# Patient Record
Sex: FEMALE | Race: WHITE | NOT HISPANIC OR LATINO | Employment: PART TIME | URBAN - NONMETROPOLITAN AREA
[De-identification: names, ages, dates, MRNs, and addresses within clinical notes are randomized per-mention and may not be internally consistent; named-entity substitution may affect disease eponyms.]

---

## 2023-06-19 ENCOUNTER — APPOINTMENT (OUTPATIENT)
Dept: PRIMARY CARE | Facility: CLINIC | Age: 53
End: 2023-06-19
Payer: COMMERCIAL

## 2023-06-20 ASSESSMENT — PROMIS GLOBAL HEALTH SCALE
RATE_GENERAL_HEALTH: GOOD
RATE_PHYSICAL_HEALTH: GOOD
EMOTIONAL_PROBLEMS: NEVER
RATE_AVERAGE_PAIN: 0
CARRYOUT_PHYSICAL_ACTIVITIES: COMPLETELY
RATE_QUALITY_OF_LIFE: EXCELLENT
CARRYOUT_SOCIAL_ACTIVITIES: EXCELLENT
RATE_SOCIAL_SATISFACTION: EXCELLENT
RATE_MENTAL_HEALTH: EXCELLENT

## 2023-06-21 PROBLEM — E55.9 VITAMIN D DEFICIENCY: Status: ACTIVE | Noted: 2023-06-21

## 2023-06-21 PROBLEM — R05.9 COUGH: Status: ACTIVE | Noted: 2023-06-21

## 2023-06-21 PROBLEM — G47.00 INSOMNIA: Status: ACTIVE | Noted: 2023-06-21

## 2023-06-21 PROBLEM — F33.2 MODERATELY SEVERE RECURRENT MAJOR DEPRESSION (MULTI): Status: ACTIVE | Noted: 2023-06-21

## 2023-06-21 PROBLEM — F51.04 PSYCHOPHYSIOLOGICAL INSOMNIA: Status: ACTIVE | Noted: 2023-06-21

## 2023-06-21 PROBLEM — F41.9 MODERATE ANXIETY: Status: ACTIVE | Noted: 2023-06-21

## 2023-06-21 PROBLEM — R63.4 WEIGHT LOSS: Status: ACTIVE | Noted: 2023-06-21

## 2023-06-21 PROBLEM — F51.09 SITUATIONAL INSOMNIA: Status: ACTIVE | Noted: 2023-06-21

## 2023-06-21 PROBLEM — C44.91 BCC (BASAL CELL CARCINOMA): Status: ACTIVE | Noted: 2023-06-21

## 2023-06-21 PROBLEM — J32.9 SINUSITIS: Status: ACTIVE | Noted: 2023-06-21

## 2023-06-21 RX ORDER — SERTRALINE HYDROCHLORIDE 50 MG/1
3 TABLET, FILM COATED ORAL DAILY
COMMUNITY
Start: 2021-02-03 | End: 2023-06-22 | Stop reason: SDUPTHER

## 2023-06-21 RX ORDER — ERGOCALCIFEROL 1.25 MG/1
1 CAPSULE ORAL
COMMUNITY
Start: 2021-05-25 | End: 2023-06-22 | Stop reason: ALTCHOICE

## 2023-06-21 RX ORDER — TRAZODONE HYDROCHLORIDE 50 MG/1
TABLET ORAL
COMMUNITY
Start: 2021-02-26 | End: 2023-06-22 | Stop reason: SDUPTHER

## 2023-06-22 ENCOUNTER — OFFICE VISIT (OUTPATIENT)
Dept: PRIMARY CARE | Facility: CLINIC | Age: 53
End: 2023-06-22
Payer: COMMERCIAL

## 2023-06-22 VITALS
HEART RATE: 85 BPM | DIASTOLIC BLOOD PRESSURE: 76 MMHG | HEIGHT: 66 IN | TEMPERATURE: 98.1 F | SYSTOLIC BLOOD PRESSURE: 147 MMHG | RESPIRATION RATE: 16 BRPM | OXYGEN SATURATION: 98 % | WEIGHT: 182.2 LBS | BODY MASS INDEX: 29.28 KG/M2

## 2023-06-22 DIAGNOSIS — E78.5 HYPERLIPIDEMIA, UNSPECIFIED HYPERLIPIDEMIA TYPE: ICD-10-CM

## 2023-06-22 DIAGNOSIS — F41.9 MODERATE ANXIETY: ICD-10-CM

## 2023-06-22 DIAGNOSIS — G47.00 INSOMNIA, UNSPECIFIED TYPE: ICD-10-CM

## 2023-06-22 DIAGNOSIS — E66.3 OVERWEIGHT WITH BODY MASS INDEX (BMI) OF 29 TO 29.9 IN ADULT: ICD-10-CM

## 2023-06-22 DIAGNOSIS — Z00.00 HEALTHCARE MAINTENANCE: Primary | ICD-10-CM

## 2023-06-22 PROBLEM — F51.09 SITUATIONAL INSOMNIA: Status: RESOLVED | Noted: 2023-06-21 | Resolved: 2023-06-22

## 2023-06-22 PROBLEM — F51.04 PSYCHOPHYSIOLOGICAL INSOMNIA: Status: RESOLVED | Noted: 2023-06-21 | Resolved: 2023-06-22

## 2023-06-22 PROCEDURE — 1036F TOBACCO NON-USER: CPT | Performed by: FAMILY MEDICINE

## 2023-06-22 PROCEDURE — 99214 OFFICE O/P EST MOD 30 MIN: CPT | Performed by: FAMILY MEDICINE

## 2023-06-22 PROCEDURE — 99396 PREV VISIT EST AGE 40-64: CPT | Performed by: FAMILY MEDICINE

## 2023-06-22 PROCEDURE — 3008F BODY MASS INDEX DOCD: CPT | Performed by: FAMILY MEDICINE

## 2023-06-22 RX ORDER — ROSUVASTATIN CALCIUM 20 MG/1
20 TABLET, COATED ORAL DAILY
Qty: 30 TABLET | Refills: 5 | Status: SHIPPED | OUTPATIENT
Start: 2023-06-22 | End: 2023-06-22 | Stop reason: SDUPTHER

## 2023-06-22 RX ORDER — ROSUVASTATIN CALCIUM 20 MG/1
20 TABLET, COATED ORAL DAILY
Qty: 30 TABLET | Refills: 5 | Status: SHIPPED | OUTPATIENT
Start: 2023-06-22 | End: 2023-12-19

## 2023-06-22 RX ORDER — SERTRALINE HYDROCHLORIDE 50 MG/1
150 TABLET, FILM COATED ORAL DAILY
Qty: 90 TABLET | Refills: 3 | Status: SHIPPED | OUTPATIENT
Start: 2023-06-22 | End: 2024-01-02

## 2023-06-22 RX ORDER — TRAZODONE HYDROCHLORIDE 50 MG/1
TABLET ORAL
Qty: 180 TABLET | Refills: 3 | Status: SHIPPED | OUTPATIENT
Start: 2023-06-22

## 2023-06-22 ASSESSMENT — ANXIETY QUESTIONNAIRES
1. FEELING NERVOUS, ANXIOUS, OR ON EDGE: NOT AT ALL
5. BEING SO RESTLESS THAT IT IS HARD TO SIT STILL: NOT AT ALL
2. NOT BEING ABLE TO STOP OR CONTROL WORRYING: NOT AT ALL
6. BECOMING EASILY ANNOYED OR IRRITABLE: NOT AT ALL
7. FEELING AFRAID AS IF SOMETHING AWFUL MIGHT HAPPEN: NOT AT ALL
IF YOU CHECKED OFF ANY PROBLEMS ON THIS QUESTIONNAIRE, HOW DIFFICULT HAVE THESE PROBLEMS MADE IT FOR YOU TO DO YOUR WORK, TAKE CARE OF THINGS AT HOME, OR GET ALONG WITH OTHER PEOPLE: NOT DIFFICULT AT ALL
4. TROUBLE RELAXING: NOT AT ALL
3. WORRYING TOO MUCH ABOUT DIFFERENT THINGS: NOT AT ALL
GAD7 TOTAL SCORE: 0

## 2023-06-22 ASSESSMENT — PATIENT HEALTH QUESTIONNAIRE - PHQ9
2. FEELING DOWN, DEPRESSED OR HOPELESS: NOT AT ALL
SUM OF ALL RESPONSES TO PHQ9 QUESTIONS 1 AND 2: 0
1. LITTLE INTEREST OR PLEASURE IN DOING THINGS: NOT AT ALL

## 2023-06-22 ASSESSMENT — PAIN SCALES - GENERAL: PAINLEVEL: 0-NO PAIN

## 2023-06-22 NOTE — PROGRESS NOTES
"Subjective   Patient ID: Izzy Benitez is a 52 y.o. female who presents for Annual Exam and Results (Wants to discuss blood work results).    Well Adult Physical   Patient here for a comprehensive physical exam.      TDAP: 5/2016  SHINGRIX: completed  PNEUMOVAX: N/A  PAP: none found - managed by GYN  MAMMO: none found - managed by GYN  EGD: 10/2014  CSCOPE: 10/2014  DEXA: N/A  HEP C SCREEN: 8/2021 negative  CACS: none found - deferred 6/2023 due to anxiety  LIPID: 5/2021 TC/HDL ratio 41, [id number]    Diet/Exercise: Feels she has not been eating as well regularly.  Eating horribly, lots of sugar/chocolate, fast food.  Not eating enough veggies.  Stopped drinking after dry January   Stopped having palpitations with no alcohol intake    Father with hx of a-fib s/p ablation.   Mom and Father with hx of of HLD  She reports hx of white coat hypertension  BP normal at GYN recently    Breast cancer screening: Managed by GYN  Denies lumps/bumps, skin changes, nipple retraction, or nipple drainage.    Cervical cancer screening: Managed by GYN  Denies pelvic pain, vaginal discharge, or vaginal bleeding.    Colon cancer screening: Denies family history.   Denies melena, hematochezia, constipation, diarrhea, bloating, change in bowel habits.    MOOD  In 2021 she had period of not sleeping for 4 days  Had bad anxiety after this  Lost 30+ pounds, hair falling out   Started Zoloft and then started feeling like herself again.    She notes she has been deferring treatment for other issues because of denial from her anxiety.    Patient denies chest pain, shortness of breath with exertion, palpitations, lower extremity edema, headache, or dizziness.     Review of Systems   All other systems reviewed and are negative.      Objective   /76 (BP Location: Left arm, Patient Position: Sitting, BP Cuff Size: Adult)   Pulse 85   Temp 36.7 °C (98.1 °F) (Temporal)   Resp 16   Ht 1.664 m (5' 5.5\")   Wt 82.6 kg (182 lb 3.2 oz)   LMP " 04/17/2023 (Approximate)   SpO2 98%   BMI 29.86 kg/m²     Physical Exam  Vitals and nursing note reviewed.   Constitutional:       General: She is not in acute distress.     Appearance: Normal appearance. She is not toxic-appearing.   HENT:      Head: Normocephalic and atraumatic.   Eyes:      Extraocular Movements: Extraocular movements intact.      Pupils: Pupils are equal, round, and reactive to light.   Neck:      Thyroid: No thyromegaly.      Vascular: No hepatojugular reflux or JVD.   Cardiovascular:      Rate and Rhythm: Normal rate and regular rhythm.      Heart sounds: Murmur heard.      Systolic murmur is present with a grade of 1/6.      No friction rub. No gallop.   Pulmonary:      Effort: Pulmonary effort is normal.      Breath sounds: Normal breath sounds. No wheezing, rhonchi or rales.   Abdominal:      General: Bowel sounds are normal. There is no distension.      Palpations: Abdomen is soft. There is no mass.      Tenderness: There is no abdominal tenderness. There is no guarding.   Musculoskeletal:         General: Normal range of motion.      Right lower leg: No edema.      Left lower leg: No edema.   Lymphadenopathy:      Cervical: No cervical adenopathy.   Skin:     General: Skin is warm and dry.   Neurological:      General: No focal deficit present.      Mental Status: She is alert and oriented to person, place, and time.      Gait: Gait normal.   Psychiatric:         Mood and Affect: Mood normal.         Behavior: Behavior normal.         Assessment/Plan   Problem List Items Addressed This Visit       Insomnia     Stable, continue with Trazodone at bedtime as needed.         Moderate anxiety     Scales and scores have been reviewed and discussed, doing well.  Has been stable on the Zoloft for years now, she will continue with this.  Other non-prescription measures revisited with patient.         Healthcare maintenance - Primary     Vaccines and screenings reviewed.  Questionnaires  completed.  Health and wellness topics reviewed.  Diet and exercise recommendations revisited.  Routine blood work ordered today.  ROR for most recent pap, mammo, and colonoscopy.         Relevant Orders    Comprehensive Metabolic Panel    Lipid Panel    Overweight with body mass index (BMI) of 29 to 29.9 in adult    Hyperlipidemia     Outside labs reviewed with patient today,   Guidelines recommend statin therapy in those with LDL greater than 190  Patient agreeable to trial with statin therapy, Atorvastatin 20 mg sent to pharm.  CACS discussed with patient today, this was deferred due to patient's history of anxiety, would like to try statin/lifestyle measures and recheck blood work first.    Statin therapy is used to stabilize and regress plaque build up. Patients typically tolerate statin therapy well. The most common side effect of statin therapy is muscle aches and cramping. To protect against muscle cramping Recommend supplementation with Vitamin D3 2942-9795 IU daily, which can be purchased over the counter.     Diet and exercise recommendations revisited.  To lower this with lifestyle measures:  -make sure you are avoiding refined carbs such as breads, pasta, cereal, candy, soda,  nutrition bars, granola, chips, and sugar sweetened beverages.      -eat 5- 7 servings daily of veggies,  healthy protein such as chicken, fish,  beans, and eggs, and include healthy fats in your diet such as seeds, nuts, olive oil, avocados, and salmon.   -exercise 4 - 6 days per week as you are able, 150 minutes total weekly divided up is recommended.  -consider taking the fiber product psyllium capsules or powder 2 times daily (generic brand is fine) , or a brand name psyllium such as [facility] [facility] or Metamucil.  -consider also adding plant stanols and sterols such as Nature Made CholestOff, available over the counter.          Relevant Medications    rosuvastatin (Crestor) 20 mg tablet     Follow-up in 4 months for  recheck above + lab review.  Labs to be done prior.   Call for sooner follow-up if needed.          Scribe Attestation  By signing my name below, I, Anand Vargas   attest that this documentation has been prepared under the direction and in the presence of Liana Roberts DO.

## 2023-06-22 NOTE — ASSESSMENT & PLAN NOTE
Scales and scores have been reviewed and discussed, doing well.  Has been stable on the Zoloft for years now, she will continue with this.  Other non-prescription measures revisited with patient.

## 2023-06-22 NOTE — ASSESSMENT & PLAN NOTE
Outside labs reviewed with patient today,   Guidelines recommend statin therapy in those with LDL greater than 190  Patient agreeable to trial with statin therapy, Rosuvastatin 20 mg sent to pharm.  CACS discussed with patient today, this was deferred due to patient's history of anxiety, would like to try statin/lifestyle measures and recheck blood work first.    Statin therapy is used to stabilize and regress plaque build up. Patients typically tolerate statin therapy well. The most common side effect of statin therapy is muscle aches and cramping. To protect against muscle cramping Recommend supplementation with Vitamin D3 2192-9141 IU daily, which can be purchased over the counter.     Diet and exercise recommendations revisited.  To lower this with lifestyle measures:  -make sure you are avoiding refined carbs such as breads, pasta, cereal, candy, soda,  nutrition bars, granola, chips, and sugar sweetened beverages.      -eat 5- 7 servings daily of veggies,  healthy protein such as chicken, fish,  beans, and eggs, and include healthy fats in your diet such as seeds, nuts, olive oil, avocados, and salmon.   -exercise 4 - 6 days per week as you are able, 150 minutes total weekly divided up is recommended.  -consider taking the fiber product psyllium capsules or powder 2 times daily (generic brand is fine) , or a brand name psyllium such as [facility] or Metamucil.  -consider also adding plant stanols and sterols such as Nature Made CholestOff, available over the counter.

## 2023-06-22 NOTE — PATIENT INSTRUCTIONS
WELLNESS EXAM COMPLETED TODAY    SCREENINGS:  -For those at average risk, American Cancer Society recommends Screening for Cervical Cancer (pap smears) are recommended starting at age 21 and continuing through age 65.   -For those at average risk, American Cancer Society now recommends breast cancer screening starting at the age of 40-45.  -For those at average risk, American Cancer Society now recommends screening for colon cancer starting at the age of 45.  -Bone density screening recommended starting at 65.    LIFESTYLE MEASURES:  -make sure you are avoiding refined carbs such as breads, pasta, cereal, candy, soda,  nutrition bars, granola, chips, and sugar sweetened beverages.      -eat 5- 7 servings daily of veggies,  healthy protein such as chicken, fish,  beans, and eggs, and include healthy fats in your diet such as seeds, nuts, olive oil, avocados, and salmon.   -exercise 4 - 6 days per week as you are able, 150 minutes total weekly divided up is recommended.  -Vitamin D is recommended at 1000 - 5000 IU international units daily.   -Calcium is recommended at 5584-2959 mg daily.  -64 oz of water is recommended daily.   -Always wear sunscreen when you have sun exposure.  -Dental visits recommended every 6 months.  -Eye exam recommended every 2 years, for those with vision problems every year.

## 2023-06-22 NOTE — ASSESSMENT & PLAN NOTE
Vaccines and screenings reviewed.  Questionnaires completed.  Health and wellness topics reviewed.  Diet and exercise recommendations revisited.  Routine blood work ordered today.  ROR for most recent pap, mammo, and colonoscopy.

## 2023-10-09 ENCOUNTER — APPOINTMENT (OUTPATIENT)
Dept: PRIMARY CARE | Facility: CLINIC | Age: 53
End: 2023-10-09
Payer: COMMERCIAL

## 2023-11-27 ENCOUNTER — APPOINTMENT (OUTPATIENT)
Dept: PRIMARY CARE | Facility: CLINIC | Age: 53
End: 2023-11-27
Payer: COMMERCIAL

## 2023-12-11 ENCOUNTER — APPOINTMENT (OUTPATIENT)
Dept: PRIMARY CARE | Facility: CLINIC | Age: 53
End: 2023-12-11
Payer: COMMERCIAL

## 2023-12-31 DIAGNOSIS — G47.00 INSOMNIA, UNSPECIFIED TYPE: ICD-10-CM

## 2024-01-02 RX ORDER — SERTRALINE HYDROCHLORIDE 50 MG/1
150 TABLET, FILM COATED ORAL DAILY
Qty: 270 TABLET | Refills: 0 | Status: SHIPPED | OUTPATIENT
Start: 2024-01-02

## 2024-03-11 ENCOUNTER — APPOINTMENT (OUTPATIENT)
Dept: PRIMARY CARE | Facility: CLINIC | Age: 54
End: 2024-03-11
Payer: COMMERCIAL

## 2024-04-12 ENCOUNTER — APPOINTMENT (OUTPATIENT)
Dept: PRIMARY CARE | Facility: CLINIC | Age: 54
End: 2024-04-12
Payer: COMMERCIAL

## 2024-05-06 ENCOUNTER — APPOINTMENT (OUTPATIENT)
Dept: PRIMARY CARE | Facility: CLINIC | Age: 54
End: 2024-05-06
Payer: COMMERCIAL

## 2024-06-18 ENCOUNTER — APPOINTMENT (OUTPATIENT)
Dept: PRIMARY CARE | Facility: CLINIC | Age: 54
End: 2024-06-18
Payer: COMMERCIAL

## 2024-07-19 DIAGNOSIS — G47.00 INSOMNIA, UNSPECIFIED TYPE: ICD-10-CM

## 2024-07-19 RX ORDER — SERTRALINE HYDROCHLORIDE 50 MG/1
150 TABLET, FILM COATED ORAL DAILY
Qty: 270 TABLET | Refills: 0 | Status: SHIPPED | OUTPATIENT
Start: 2024-07-19

## 2024-08-19 ENCOUNTER — APPOINTMENT (OUTPATIENT)
Dept: PRIMARY CARE | Facility: CLINIC | Age: 54
End: 2024-08-19
Payer: COMMERCIAL

## 2024-08-21 ENCOUNTER — APPOINTMENT (OUTPATIENT)
Dept: PRIMARY CARE | Facility: CLINIC | Age: 54
End: 2024-08-21
Payer: COMMERCIAL

## 2024-08-21 DIAGNOSIS — F33.2 MODERATELY SEVERE RECURRENT MAJOR DEPRESSION (MULTI): ICD-10-CM

## 2024-08-21 DIAGNOSIS — E78.5 HYPERLIPIDEMIA, UNSPECIFIED HYPERLIPIDEMIA TYPE: ICD-10-CM

## 2024-08-21 DIAGNOSIS — F41.9 MODERATE ANXIETY: Primary | ICD-10-CM

## 2024-08-21 DIAGNOSIS — G47.00 INSOMNIA, UNSPECIFIED TYPE: ICD-10-CM

## 2024-08-21 PROBLEM — R03.0 ELEVATED BLOOD PRESSURE READING WITHOUT DIAGNOSIS OF HYPERTENSION: Status: ACTIVE | Noted: 2024-01-05

## 2024-08-21 PROBLEM — Z85.828 HISTORY OF BASAL CELL CARCINOMA (BCC) OF SKIN: Status: ACTIVE | Noted: 2023-06-21

## 2024-08-21 PROBLEM — E66.3 OVERWEIGHT WITH BODY MASS INDEX (BMI) OF 29 TO 29.9 IN ADULT: Status: RESOLVED | Noted: 2023-06-22 | Resolved: 2024-08-21

## 2024-08-21 PROCEDURE — 1036F TOBACCO NON-USER: CPT | Performed by: FAMILY MEDICINE

## 2024-08-21 PROCEDURE — 99213 OFFICE O/P EST LOW 20 MIN: CPT | Performed by: FAMILY MEDICINE

## 2024-08-21 RX ORDER — OMEPRAZOLE 20 MG/1
20 CAPSULE, DELAYED RELEASE ORAL
COMMUNITY
Start: 2024-06-26

## 2024-08-21 ASSESSMENT — ANXIETY QUESTIONNAIRES
4. TROUBLE RELAXING: NOT AT ALL
2. NOT BEING ABLE TO STOP OR CONTROL WORRYING: NOT AT ALL
5. BEING SO RESTLESS THAT IT IS HARD TO SIT STILL: NOT AT ALL
GAD7 TOTAL SCORE: 0
6. BECOMING EASILY ANNOYED OR IRRITABLE: NOT AT ALL
1. FEELING NERVOUS, ANXIOUS, OR ON EDGE: NOT AT ALL
3. WORRYING TOO MUCH ABOUT DIFFERENT THINGS: NOT AT ALL
IF YOU CHECKED OFF ANY PROBLEMS ON THIS QUESTIONNAIRE, HOW DIFFICULT HAVE THESE PROBLEMS MADE IT FOR YOU TO DO YOUR WORK, TAKE CARE OF THINGS AT HOME, OR GET ALONG WITH OTHER PEOPLE: NOT DIFFICULT AT ALL
7. FEELING AFRAID AS IF SOMETHING AWFUL MIGHT HAPPEN: NOT AT ALL

## 2024-08-21 NOTE — ASSESSMENT & PLAN NOTE
6/19/2023 TC/HDL ratio 5.1, , , HDL 55, Trig 96 (before statin)  7/15/2024 TC/HDL ratio 4.2, , , HDL 47 (on Rosuv 20 mg daily)  Goal TC/HDL ratio 3.4 or less, LDL 99 or less,  or less, and TRIG 150 or less.     Patient was started on statin therapy in 6/2023, repeat lipid panel on statin noted improvement in cholesterol numbers but not quite at goal. At this time patient is disinclined to increase statin dose at this time, wants to try and work on lifestyle measures further and recheck labs when doing this consistently.    Diet and exercise recommendations revisited.  To lower this with lifestyle measures:  -make sure you are avoiding refined carbs such as breads, pasta, cereal, candy, soda,  nutrition bars, granola, chips, and sugar sweetened beverages.      -eat 5- 7 servings daily of veggies,  healthy protein such as chicken, fish,  beans, and eggs, and include healthy fats in your diet such as seeds, nuts, olive oil, avocados, and salmon.   -exercise 4 - 6 days per week as you are able, 150 minutes total weekly divided up is recommended.  -consider taking the fiber product psyllium capsules or powder 2 times daily (generic brand is fine) , or a brand name psyllium such as [place] [facility] or Metamucil.  -consider also adding plant stanols and sterols such as Nature Made CholestOff, available over the counter.

## 2024-08-21 NOTE — ASSESSMENT & PLAN NOTE
Scales and scores have been reviewed and discussed, doing well.  Has been stable on the Zoloft for years now, she will continue with this.  Keep up your great work :)

## 2024-08-21 NOTE — ASSESSMENT & PLAN NOTE
PHQ-2 was 0, reports no issues with depression, historically anxiety more problematic in past than the depression but this is well controlled on Zoloft.

## 2024-08-21 NOTE — PROGRESS NOTES
Virtual or Telephone Consent    An interactive audio and video telecommunication system which permits real time communications between the patient (at the originating site) and provider (at the distant site) was utilized to provide this telehealth service.   Verbal consent was requested and obtained from Izzy Benitez on this date, 08/21/24 for a telehealth visit.       Subjective   Patient ID: Izzy Benitez is a 53 y.o. female who presents for Follow-up (4 mo fuv labs, no issues or complaints, trying to send her lab results through my chart).    HPI     VIRTUAL VISIT    Patient presents today for routine follow-up.    Patient sending in copy of her labs via [facility]  7/15/2024 labs reviewed today which include:  TC/HDL ratio 4.2, , , HDL 47  Glucose wnl - A1c 5.6 (normal)  Kidney fxn normal    Patient states that before these labs were drawn she had some dietary indiscretion resulting in 10 lb weight gain. Since that time she has made healthy dietary modifications for optimizing her nutrition, cutting out sugars, working out, etc. Doing some intermittent fasting, states sugar cravings decreased with IF. Finds that her BP has improved when switching to sea salt. She has  in [place] as well as one at [facility] she will see also.    No new concerns or issues. Patient is doing well overall.     ROUTINE VISIT  CHRONIC CONDITIONS:     Mood  Hx of anxiety    In 2021 she had period of not sleeping for 4 days  Had bad anxiety after this  Lost 30+ pounds, hair falling out   Started Zoloft and then started feeling like herself again.  Has been stable on the Zoloft for years per patient report.    PHQ-2: 0  PETER-7: 0    Insomnia  Prescribed Trazodone prn/nightly.  No longer taking, states sleeping well without medications.    HLD  FMHx of mom w/ HLD and father with HLD & a-fib s/p ablation.  Trial of Rosuvastatin 20 mg sent to pharmacy 6/2023  CACS deferred 6/2023, hx of anxiety,  wants to try statin/lifestyle first.     6/19/2023 TC/HDL ratio 5.1, , , HDL 55, Trig 96 (before statin)    7/15/2024 TC/HDL ratio 4.2, , , HDL 47 (on Rosuv 20 mg daily)    Disinclined to increase statin dose at this time, wants to try and work on lifestyle measures further and recheck labs when doing this consistently.     Review of Systems   All other systems reviewed and are negative.      Objective   There were no vitals taken for this visit.    Physical Exam  Nursing note reviewed.     Visit conducted via telehealth in light of COVID-19 pandemic.    Video used     Gen: patient is alert and oriented x 3  pleasant, and in no apparent distress.  Patient appears well, no cough, no dyspnea/tachypnea observed on video.   Psychiatric: Patient has good eye contact. Mood and affect are appropriate.     Assessment/Plan   Problem List Items Addressed This Visit             ICD-10-CM    RESOLVED: Moderately severe recurrent major depression (Multi) F33.2     PHQ-2 was 0, reports no issues with depression, historically anxiety more problematic in past than the depression but this is well controlled on Zoloft.         RESOLVED: Insomnia G47.00     No longer an issue per patient report, no longer needing the Trazodone.         Moderate anxiety - Primary F41.9     Scales and scores have been reviewed and discussed, doing well.  Has been stable on the Zoloft for years now, she will continue with this.  Keep up your great work :)         Hyperlipidemia E78.5     6/19/2023 TC/HDL ratio 5.1, , , HDL 55, Trig 96 (before statin)  7/15/2024 TC/HDL ratio 4.2, , , HDL 47 (on Rosuv 20 mg daily)  Goal TC/HDL ratio 3.4 or less, LDL 99 or less,  or less, and TRIG 150 or less.     Patient was started on statin therapy in 6/2023, repeat lipid panel on statin noted improvement in cholesterol numbers but not quite at goal. At this time patient is disinclined to increase statin dose at  this time, wants to try and work on lifestyle measures further and recheck labs when doing this consistently.    Diet and exercise recommendations revisited.  To lower this with lifestyle measures:  -make sure you are avoiding refined carbs such as breads, pasta, cereal, candy, soda,  nutrition bars, granola, chips, and sugar sweetened beverages.      -eat 5- 7 servings daily of veggies,  healthy protein such as chicken, fish,  beans, and eggs, and include healthy fats in your diet such as seeds, nuts, olive oil, avocados, and salmon.   -exercise 4 - 6 days per week as you are able, 150 minutes total weekly divided up is recommended.  -consider taking the fiber product psyllium capsules or powder 2 times daily (generic brand is fine) , or a brand name psyllium such as [facility] or Metamucil.  -consider also adding plant stanols and sterols such as Nature Made CholestOff, available over the counter.           Follow-up in 1 year for annual physical.   Call for sooner follow-up if needed.         Scribe Attestation  By signing my name below, IShiela Scribe   attest that this documentation has been prepared under the direction and in the presence of Liana Roberts DO.

## 2024-10-19 DIAGNOSIS — G47.00 INSOMNIA, UNSPECIFIED TYPE: ICD-10-CM

## 2024-10-21 RX ORDER — SERTRALINE HYDROCHLORIDE 50 MG/1
150 TABLET, FILM COATED ORAL DAILY
Qty: 270 TABLET | Refills: 0 | Status: SHIPPED | OUTPATIENT
Start: 2024-10-21

## 2025-01-18 DIAGNOSIS — G47.00 INSOMNIA, UNSPECIFIED TYPE: ICD-10-CM

## 2025-01-19 RX ORDER — SERTRALINE HYDROCHLORIDE 50 MG/1
150 TABLET, FILM COATED ORAL DAILY
Qty: 270 TABLET | Refills: 1 | Status: SHIPPED | OUTPATIENT
Start: 2025-01-19

## 2025-08-27 ENCOUNTER — APPOINTMENT (OUTPATIENT)
Dept: PRIMARY CARE | Facility: CLINIC | Age: 55
End: 2025-08-27
Payer: COMMERCIAL